# Patient Record
Sex: FEMALE
[De-identification: names, ages, dates, MRNs, and addresses within clinical notes are randomized per-mention and may not be internally consistent; named-entity substitution may affect disease eponyms.]

---

## 2020-02-02 ENCOUNTER — HOSPITAL ENCOUNTER (OUTPATIENT)
Dept: HOSPITAL 5 - TRG | Age: 40
Discharge: HOME | End: 2020-02-02
Attending: OBSTETRICS & GYNECOLOGY
Payer: COMMERCIAL

## 2020-02-02 VITALS — DIASTOLIC BLOOD PRESSURE: 76 MMHG | SYSTOLIC BLOOD PRESSURE: 110 MMHG

## 2020-02-02 DIAGNOSIS — H53.8: ICD-10-CM

## 2020-02-02 DIAGNOSIS — Z3A.37: ICD-10-CM

## 2020-02-02 DIAGNOSIS — O26.893: Primary | ICD-10-CM

## 2020-02-02 LAB
ALBUMIN SERPL-MCNC: 3.4 G/DL (ref 3.9–5)
ALT SERPL-CCNC: 10 UNITS/L (ref 7–56)
BACTERIA #/AREA URNS HPF: (no result) /HPF
BASOPHILS # (AUTO): 0 K/MM3 (ref 0–0.1)
BASOPHILS NFR BLD AUTO: 0.2 % (ref 0–1.8)
BILIRUB UR QL STRIP: (no result)
BLOOD UR QL VISUAL: (no result)
BUN SERPL-MCNC: 6 MG/DL (ref 7–17)
BUN/CREAT SERPL: 20 %
CALCIUM SERPL-MCNC: 10.1 MG/DL (ref 8.4–10.2)
EOSINOPHIL # BLD AUTO: 0 K/MM3 (ref 0–0.4)
EOSINOPHIL NFR BLD AUTO: 0.3 % (ref 0–4.3)
HCT VFR BLD CALC: 35.8 % (ref 30.3–42.9)
HEMOLYSIS INDEX: 6
HGB BLD-MCNC: 12.2 GM/DL (ref 10.1–14.3)
LYMPHOCYTES # BLD AUTO: 1.2 K/MM3 (ref 1.2–5.4)
LYMPHOCYTES NFR BLD AUTO: 14.6 % (ref 13.4–35)
MCHC RBC AUTO-ENTMCNC: 34 % (ref 30–34)
MCV RBC AUTO: 87 FL (ref 79–97)
MONOCYTES # (AUTO): 0.6 K/MM3 (ref 0–0.8)
MONOCYTES % (AUTO): 7.2 % (ref 0–7.3)
PH UR STRIP: 8 [PH] (ref 5–7)
PLATELET # BLD: 203 K/MM3 (ref 140–440)
PROT UR STRIP-MCNC: (no result) MG/DL
RBC # BLD AUTO: 4.12 M/MM3 (ref 3.65–5.03)
RBC #/AREA URNS HPF: < 1 /HPF (ref 0–6)
UROBILINOGEN UR-MCNC: < 2 MG/DL (ref ?–2)
WBC #/AREA URNS HPF: < 1 /HPF (ref 0–6)

## 2020-02-02 PROCEDURE — 87086 URINE CULTURE/COLONY COUNT: CPT

## 2020-02-02 PROCEDURE — 85025 COMPLETE CBC W/AUTO DIFF WBC: CPT

## 2020-02-02 PROCEDURE — 81001 URINALYSIS AUTO W/SCOPE: CPT

## 2020-02-02 PROCEDURE — 36415 COLL VENOUS BLD VENIPUNCTURE: CPT

## 2020-02-02 PROCEDURE — 80053 COMPREHEN METABOLIC PANEL: CPT

## 2020-02-02 NOTE — PROGRESS NOTE
Assessment and Plan


A: Pregnancy at 37 2/7 weeks gestation. 


Headache, possible migraine.


Mild tachycardia.


Not in labor.


P: Labs done (urine culture pending).


Fioricet given without relief of headache. 


Plan to discharge patient to ED to be further evaluated by ED doctor. Called ED 

and told them I was bringing patient over to ED by wheelchair. Patient refused 

to see ED doctor and signed out AMA. Risks of signing out AMA discussed with 

patient. Encouraged patient to come back if symptoms do not resolve or if any 

further symptoms. 








Subjective





- Subjective


Date of service: 20


Principal diagnosis: Pregnancy at 37 2/7 weeks gestation; headache


Interval history: 


39 year old  presents at 37 2/7 weeks complaining of a throbbing headache

on the right side of her head since about 09:00 this morning. Patient reports 

blurry vision in both eyes. She states pain on the right side of her head is 

10/10. She denies falls or MVAs or trauma. She denies nausea or vomiting or 

abdominal pain. She denies exposure to any sick people or any recent travel. She

denies diarrhea or cough or sore throat. She denies swelling. Patient reports 

active fetal movement. She denies regular contractions, leaking of fluid or 

vaginal bleeding. She states she does not have any known health problems and has

never had migraine headaches. She is not currently taking any medications. She 

denies any problems with this pregnancy. She state she goes to [facility]

and has an appointment there tomorrow for prenatal visit. 





Patient reports: fetal movement normal, no loss of fluid, no vaginal bleeding, 

no contractions





Objective





- Vital Signs


Vital Signs: 


                               Vital Signs - 12hr











  20





  12:43 12:48 12:53


 


Temperature  98.4 F 


 


Pulse Rate 104 H 107 H 105 H


 


Blood Pressure 117/75  


 


O2 Sat by Pulse 97 98 98





Oximetry   














  20





  12:58 12:59 13:03


 


Temperature   


 


Pulse Rate 106 H 107 H 105 H


 


Blood Pressure  106/68 


 


O2 Sat by Pulse 98  98





Oximetry   














  20





  13:08 13:13 13:15


 


Temperature   


 


Pulse Rate 109 H 102 H 110 H


 


Blood Pressure   110/74


 


O2 Sat by Pulse 99 97 





Oximetry   














  20





  13:18 13:23 13:26


 


Temperature   


 


Pulse Rate 105 H 110 H 111 H


 


Blood Pressure   


 


O2 Sat by Pulse 97 100 94





Oximetry   














  20





  13:28 13:29 13:33


 


Temperature   


 


Pulse Rate 114 H 115 H 110 H


 


Blood Pressure  110/76 


 


O2 Sat by Pulse 96  92





Oximetry   














  20





  13:34 13:38 13:39


 


Temperature   


 


Pulse Rate 104 H 102 H 109 H


 


Blood Pressure   


 


O2 Sat by Pulse 93 83 L 94





Oximetry   














- Exam


Narrative Exam: 


A&O, NAD. Talking clearly; answers questions appropriately.


No swelling noted. 


Pulse 100-115 bpm, regular.


Eyes PERRL.


Moves all extremities well. Bilateral  strength is equal and strong. Normal 

and symmetrical motion in arms and legs. Symmetrical facial movements; 

symmetrical smile. 





Abdomen: Present: normal appearance, soft.  Absent: distention, tenderness, 

guarding, rigidity


Uterus: Present: normal, fundal height above umbilicus.  Absent: tenderness


FHR: category 1


Uterine Contraction Monitor Mode: External


Cervical Dilatation: 0


Cervical Effacement Percentage: 0


Fetal station: high


Uterine Contraction Pattern: Absent





- Labs


Labs: 


                                  Abnormal Labs











  20





  13:53 14:14 14:14


 


RDW   12.7 L 


 


Seg Neutrophils %   77.7 H 


 


Sodium    134 L


 


Carbon Dioxide    19 L


 


BUN    6 L


 


Creatinine    0.3 L


 


Alkaline Phosphatase    170 H


 


Albumin    3.4 L


 


Urine pH  8.0 H  


 


Ur Specific Gravity  1.002 L  








                         Laboratory Results - last 24 hr











  20





  13:53 14:14 14:14


 


WBC   8.3 


 


RBC   4.12 


 


Hgb   12.2 


 


Hct   35.8 


 


MCV   87 


 


MCH   30 


 


MCHC   34 


 


RDW   12.7 L 


 


Plt Count   203 


 


Lymph % (Auto)   14.6 


 


Mono % (Auto)   7.2 


 


Eos % (Auto)   0.3 


 


Baso % (Auto)   0.2 


 


Lymph #   1.2 


 


Mono #   0.6 


 


Eos #   0.0 


 


Baso #   0.0 


 


Seg Neutrophils %   77.7 H 


 


Seg Neutrophils #   6.5 


 


Sodium    134 L


 


Potassium    3.9


 


Chloride    99.9


 


Carbon Dioxide    19 L


 


Anion Gap    19


 


BUN    6 L


 


Creatinine    0.3 L


 


Estimated GFR    > 60


 


BUN/Creatinine Ratio    20


 


Glucose    79


 


Calcium    10.1


 


Total Bilirubin    0.70


 


AST    16


 


ALT    10


 


Alkaline Phosphatase    170 H


 


Total Protein    6.5


 


Albumin    3.4 L


 


Albumin/Globulin Ratio    1.1


 


Urine Color  Straw  


 


Urine Turbidity  Clear  


 


Urine pH  8.0 H  


 


Ur Specific Gravity  1.002 L  


 


Urine Protein  <15 mg/dl  


 


Urine Glucose (UA)  Neg  


 


Urine Ketones  Neg  


 


Urine Blood  Sm  


 


Urine Nitrite  Neg  


 


Urine Bilirubin  Neg  


 


Urine Urobilinogen  < 2.0  


 


Ur Leukocyte Esterase  Neg  


 


Urine WBC (Auto)  < 1.0  


 


Urine RBC (Auto)  < 1.0  


 


Urine Bacteria (Auto)  2+

## 2020-02-26 ENCOUNTER — HOSPITAL ENCOUNTER (INPATIENT)
Dept: HOSPITAL 5 - TRG | Age: 40
LOS: 2 days | Discharge: HOME | End: 2020-02-28
Attending: OBSTETRICS & GYNECOLOGY | Admitting: OBSTETRICS & GYNECOLOGY
Payer: COMMERCIAL

## 2020-02-26 DIAGNOSIS — O99.89: ICD-10-CM

## 2020-02-26 DIAGNOSIS — Z88.0: ICD-10-CM

## 2020-02-26 DIAGNOSIS — Z82.49: ICD-10-CM

## 2020-02-26 DIAGNOSIS — Z3A.40: ICD-10-CM

## 2020-02-26 DIAGNOSIS — Z83.3: ICD-10-CM

## 2020-02-26 DIAGNOSIS — M06.9: ICD-10-CM

## 2020-02-26 LAB
HCT VFR BLD CALC: 39 % (ref 30.3–42.9)
HGB BLD-MCNC: 12.7 GM/DL (ref 10.1–14.3)
MCHC RBC AUTO-ENTMCNC: 33 % (ref 30–34)
MCV RBC AUTO: 88 FL (ref 79–97)
PLATELET # BLD: 164 K/MM3 (ref 140–440)
RBC # BLD AUTO: 4.45 M/MM3 (ref 3.65–5.03)

## 2020-02-26 PROCEDURE — 36415 COLL VENOUS BLD VENIPUNCTURE: CPT

## 2020-02-26 PROCEDURE — 85018 HEMOGLOBIN: CPT

## 2020-02-26 PROCEDURE — 85014 HEMATOCRIT: CPT

## 2020-02-26 PROCEDURE — 86901 BLOOD TYPING SEROLOGIC RH(D): CPT

## 2020-02-26 PROCEDURE — 86850 RBC ANTIBODY SCREEN: CPT

## 2020-02-26 PROCEDURE — 86900 BLOOD TYPING SEROLOGIC ABO: CPT

## 2020-02-26 PROCEDURE — 85027 COMPLETE CBC AUTOMATED: CPT

## 2020-02-26 RX ADMIN — HYDROCODONE BITARTRATE AND ACETAMINOPHEN PRN EACH: 5; 325 TABLET ORAL at 23:40

## 2020-02-26 RX ADMIN — BUTORPHANOL TARTRATE PRN MG: 2 INJECTION, SOLUTION INTRAMUSCULAR; INTRAVENOUS at 20:35

## 2020-02-26 RX ADMIN — SODIUM CHLORIDE, SODIUM LACTATE, POTASSIUM CHLORIDE, AND CALCIUM CHLORIDE SCH MLS/HR: .6; .31; .03; .02 INJECTION, SOLUTION INTRAVENOUS at 20:57

## 2020-02-26 RX ADMIN — BUTORPHANOL TARTRATE PRN MG: 2 INJECTION, SOLUTION INTRAMUSCULAR; INTRAVENOUS at 17:16

## 2020-02-26 RX ADMIN — SODIUM CHLORIDE, SODIUM LACTATE, POTASSIUM CHLORIDE, AND CALCIUM CHLORIDE SCH MLS/HR: .6; .31; .03; .02 INJECTION, SOLUTION INTRAVENOUS at 14:22

## 2020-02-26 RX ADMIN — ONDANSETRON PRN MG: 2 INJECTION INTRAMUSCULAR; INTRAVENOUS at 20:35

## 2020-02-26 RX ADMIN — ONDANSETRON PRN MG: 2 INJECTION INTRAMUSCULAR; INTRAVENOUS at 17:16

## 2020-02-26 NOTE — HISTORY AND PHYSICAL REPORT
History of Present Illness


Date of examination: 20


Date of admission: 


20 08:13





Chief complaint: 


Presents for a scheduled induction schedule


History of present illness: 


Early entry to prenatal care, Prenatal course complicated by Rheumatoid 

Arthritis; Co-managed with [facility]. 








Past History


Past Medical History: other (Rheumatoid Arthritis )


Past Surgical History: other (hernia removed)


Family/Genetic History: diabetes, hypertension


Social history: no significant social history, 





- Obstetrical History


Expected Date of Delivery: 20


Actual Gestation: 40 Week(s) 2 Day(s) 


: 4


Para: 2


Spontaneous Abortions: 1


Number of Living Children: 2


  ** #1


Infant Gender: Female


Birth year: 


Birthweight: 3.345 kg


Method of Delivery: Vaginal


Gestational age at delivery: 39


Complications: none





  ** #2


Infant Gender: Male


Birth year: 


Birthweight: 2.948 kg


Method of Delivery: Vaginal


Gestational age at delivery: 41


Complications: none





Medications and Allergies


                                    Allergies











Allergy/AdvReac Type Severity Reaction Status Date / Time


 


Penicillins Allergy Mild Rash Verified 20 13:47











                                Home Medications











 Medication  Instructions  Recorded  Confirmed  Last Taken  Type


 


Prenatal Vitamin 1 tab PO DAILY 20 History











Active Meds: 


Active Medications





Butorphanol Tartrate (Stadol)  2 mg IV Q2H PRN


   PRN Reason: Labor Pain


Ephedrine Sulfate (Ephedrine Sulfate)  10 mg IV Q2M PRN


   PRN Reason: Hypotension


Oxytocin/Sodium Chloride (Pitocin/Ns 20 Unit/1000ml Drip)  20 units in 1,000 mls

@ 125 mls/hr IV AS DIRECT CAL


Lactated Ringer's (Lactated Ringers)  1,000 mls @ 125 mls/hr IV AS DIRECT CAL


   Last Admin: 20 14:22 Dose:  125 mls/hr


   Documented by: 


Oxytocin/Sodium Chloride (Pitocin/Ns 30 Unit/500ml)  30 units in 500 mls @ 4 m

ls/hr IV TITR CAL; Protocol


   Last Admin: 20 14:23 Dose:  4 mls/hr, 4 mls/hr


   Documented by: 


Lidocaine (Xylocaine 2%)  20 ml INFILTRATI ONCE NR


   Stop: 20 09:59


Mineral Oil (Mineral Oil)  30 ml PO QHS PRN


   PRN Reason: Constipation


Naloxone HCl (Naloxone)  0.1 mg IV Q2MIN PRN


   PRN Reason: Res Rate </= 8 or 02 SAT < 92%


Ondansetron HCl (Zofran)  4 mg IV Q8H PRN


   PRN Reason: Nausea And Vomiting


Terbutaline Sulfate (Brethine)  0.25 mg SUB-Q ONCE PRN


   PRN Reason: Hyperstimulation/Hypertonicity


Terbutaline Sulfate (Brethine)  0.25 mg IVP ONCE PRN


   PRN Reason: Hyperstimulation/Hypertonicity











Review of Systems


All systems: negative





- Vital Signs


Vital signs: 


                                   Vital Signs











Temp Pulse Resp BP


 


 97.8 F   112 H  18   122/67 


 


 20 08:36  20 08:36  20 08:36  20 08:36








                                        











Temp Pulse Resp BP Pulse Ox


 


 98.0 F   109 H  18   125/70    


 


 20 12:03  20 14:27  20 12:03  20 14:27   














- Physical Exam


Breasts: Positive: normal


Cardiovascular: Regular rate


Lungs: Positive: Clear to auscultation, Normal air movement


Abdomen: Positive: normal appearance, soft, normal bowel sounds


Genitourinary (Female): Positive: normal external genitalia, normal perenium


Vagina: Positive: normal moisture


Uterus: Positive: enlarged


Anus/Rectum: Positive: normal perianal skin


Extremities: Positive: edema (+1)





- Obstetrical


FHR: category 1


Uterine Contraction Monitor Mode: External


Cervical Dilatation: 3 (VTX, Intact)


Cervical Effacement Percentage: 70


Fetal station: -2


Uterine Contraction Pattern: Irregular


Uterine Tone Measurement Phase: Resting


Uterine Contraction Intensity: Mild





Results


Result Diagrams: 


                                 20 10:20





                              Abnormal lab results











  20 Range/Units





  10:20 


 


RDW  13.0 L  (13.2-15.2)  %








All other labs normal.








Assessment and Plan


A: IUP @ 40 2/7 Weeks


     Category I Tracing


    AMA


    Rheumatoid Arthritis


    GBS Negative





P: Admit to L&D per Routine Orders


    Cytotec Induction


    Pitocin Augmentation

## 2020-02-26 NOTE — PROGRESS NOTE
Assessment and Plan


A: IUP @ 40 2/7 Weeks


     Fetal Bradycardia (resolved)


     Category I Tracing


     AMA


     Rheumatoid Arthritis


     GBS Negative





P: D/C Pitocin 


    Dr. Harrington called to Bedside


    Multiple Maternal Positions Changes


    O2 Mask 


    Fluid Bolous 


    AROM


    Internals x 2


    Re-start pitocin in 45mins to 1 hour; if FHTs remain stable





Subjective





- Subjective


Date of service: 02/26/20


Interval history: 


Early entry to prenatal care, Prenatal course complicated by Rheumatoid 

Arthritis; Co-managed with [facility]. 








Objective





- Vital Signs


Vital Signs: 


                               Vital Signs - 12hr











  02/26/20 02/26/20 02/26/20





  08:36 12:03 12:05


 


Temperature 97.8 F 98.0 F 


 


Pulse Rate 112 H 90 90


 


Respiratory 18 18 





Rate   


 


Blood Pressure 122/67  111/65


 


Blood Pressure 122/67  





[Left]   


 


Blood Pressure  111/65 





[Right]   


 


O2 Sat by Pulse   





Oximetry   














  02/26/20 02/26/20





  14:27 14:40


 


Temperature  


 


Pulse Rate 109 H 117 H


 


Respiratory  





Rate  


 


Blood Pressure 125/70 


 


Blood Pressure  





[Left]  


 


Blood Pressure  





[Right]  


 


O2 Sat by Pulse  100





Oximetry  














- Exam


Breasts: normal


Cardiovascular: Regular rate


Abdomen: Present: normal appearance, soft, normal bowel sounds


Uterus: Present: normal, firm


FHR: category 1


FHR comments: 


Infant had a bradycardia down to the 60s soon after Pitocin was started.  O2 

face mask placed; fluid bolous started; AROM and internals placed x2, patient pu

t in the knee chest position, and Dr. Harrington called to bedside.  FHTs 

returned to Category I Tracing





Uterine Contraction Monitor Mode: Internal


Cervical Dilatation: 3 (Moderate amount of clear fluid upon AROM at 1440)


Cervical Effacement Percentage: 70


Fetal station: -2


Uterine Contraction Pattern: Irregular


Uterine Tone Measurement Phase: Resting


Uterine Contraction Intensity: Moderate


Extremities: edema





- Labs


Labs: 


                                  Abnormal Labs











  02/26/20





  10:20


 


RDW  13.0 L








                         Laboratory Results - last 24 hr











  02/26/20 02/26/20





  10:15 10:20


 


WBC   8.1


 


RBC   4.45


 


Hgb   12.7


 


Hct   39.0


 


MCV   88


 


MCH   29


 


MCHC   33


 


RDW   13.0 L


 


Plt Count   164


 


Blood Type  B POSITIVE 


 


Antibody Screen  Negative

## 2020-02-26 NOTE — PROGRESS NOTE
Assessment and Plan


A: IUP @ 40 2/7 Weeks


     Category I Tracing


     AMA


     Rheumatoid Arthritis


     GBS Negative





P: Continue Pitocin Augmentation


    Multiple Maternal Positions Changes


   





Subjective





- Subjective


Date of service: 02/26/20


Interval history: 


Early entry to prenatal care, Prenatal course complicated by Rheumatoid 

Arthritis; Co-managed with [facility]. 





Patient reports: fetal movement normal, contractions





Objective





- Vital Signs


Vital Signs: 


                               Vital Signs - 12hr











  02/26/20 02/26/20 02/26/20





  08:36 12:03 12:05


 


Temperature 97.8 F 98.0 F 


 


Pulse Rate 112 H 90 90


 


Respiratory 18 18 





Rate   


 


Blood Pressure 122/67  111/65


 


Blood Pressure 122/67  





[Left]   


 


Blood Pressure  111/65 





[Right]   


 


O2 Sat by Pulse   





Oximetry   














  02/26/20 02/26/20 02/26/20





  14:27 14:40 15:26


 


Temperature   


 


Pulse Rate 109 H 117 H 82


 


Respiratory   





Rate   


 


Blood Pressure 125/70  110/56


 


Blood Pressure   





[Left]   


 


Blood Pressure   





[Right]   


 


O2 Sat by Pulse  100 





Oximetry   














  02/26/20 02/26/20 02/26/20





  15:41 15:46 15:51


 


Temperature   


 


Pulse Rate 96 H 94 H 94 H


 


Respiratory   





Rate   


 


Blood Pressure   


 


Blood Pressure   





[Left]   


 


Blood Pressure   





[Right]   


 


O2 Sat by Pulse 99 98 99





Oximetry   














  02/26/20 02/26/20 02/26/20





  15:56 16:01 16:06


 


Temperature   


 


Pulse Rate 99 H 95 H 98 H


 


Respiratory   





Rate   


 


Blood Pressure   


 


Blood Pressure   





[Left]   


 


Blood Pressure   





[Right]   


 


O2 Sat by Pulse 99 100 99





Oximetry   














  02/26/20 02/26/20 02/26/20





  16:11 16:16 16:21


 


Temperature   


 


Pulse Rate 107 H 100 H 98 H


 


Respiratory   





Rate   


 


Blood Pressure   


 


Blood Pressure   





[Left]   


 


Blood Pressure   





[Right]   


 


O2 Sat by Pulse 99 99 99





Oximetry   














  02/26/20 02/26/20 02/26/20





  16:29 16:34 16:39


 


Temperature   


 


Pulse Rate 104 H 91 H 84


 


Respiratory   





Rate   


 


Blood Pressure   


 


Blood Pressure   





[Left]   


 


Blood Pressure   





[Right]   


 


O2 Sat by Pulse 99 100 99





Oximetry   














  02/26/20 02/26/20 02/26/20





  16:44 16:49 16:54


 


Temperature   


 


Pulse Rate 92 H 103 H 105 H


 


Respiratory   





Rate   


 


Blood Pressure   


 


Blood Pressure   





[Left]   


 


Blood Pressure   





[Right]   


 


O2 Sat by Pulse 98 98 99





Oximetry   














  02/26/20 02/26/20 02/26/20





  16:59 17:04 17:09


 


Temperature   


 


Pulse Rate 99 H 99 H 99 H


 


Respiratory   





Rate   


 


Blood Pressure   


 


Blood Pressure   





[Left]   


 


Blood Pressure   





[Right]   


 


O2 Sat by Pulse 99 99 97





Oximetry   














  02/26/20 02/26/20 02/26/20





  17:14 17:19 17:24


 


Temperature   


 


Pulse Rate 95 H 107 H 103 H


 


Respiratory   





Rate   


 


Blood Pressure   


 


Blood Pressure   





[Left]   


 


Blood Pressure   





[Right]   


 


O2 Sat by Pulse 97 98 96





Oximetry   














  02/26/20 02/26/20 02/26/20





  17:29 17:33 17:34


 


Temperature   


 


Pulse Rate 105 H 114 H 100 H


 


Respiratory   





Rate   


 


Blood Pressure   


 


Blood Pressure   





[Left]   


 


Blood Pressure   





[Right]   


 


O2 Sat by Pulse 96 94 96





Oximetry   














  02/26/20 02/26/20 02/26/20





  17:39 17:44 17:49


 


Temperature   


 


Pulse Rate 107 H 101 H 114 H


 


Respiratory   





Rate   


 


Blood Pressure   


 


Blood Pressure   





[Left]   


 


Blood Pressure   





[Right]   


 


O2 Sat by Pulse 97 96 95





Oximetry   














  02/26/20 02/26/20 02/26/20





  17:54 17:59 18:04


 


Temperature   


 


Pulse Rate 110 H 110 H 106 H


 


Respiratory   





Rate   


 


Blood Pressure   


 


Blood Pressure   





[Left]   


 


Blood Pressure   





[Right]   


 


O2 Sat by Pulse 96 98 95





Oximetry   














  02/26/20





  18:06


 


Temperature 


 


Pulse Rate 122 H


 


Respiratory 





Rate 


 


Blood Pressure 


 


Blood Pressure 





[Left] 


 


Blood Pressure 





[Right] 


 


O2 Sat by Pulse 94





Oximetry 














- Exam


Breasts: normal


Cardiovascular: Regular rate


Lungs: Clear to auscultation, Normal air movement


Abdomen: Present: normal appearance, soft, normal bowel sounds


Uterus: Present: normal, firm, fundal height above umbilicus


FHR: category 1


Uterine Contraction Monitor Mode: External


Cervical Dilatation: 3 (leaking a small amount of clear fluid)


Cervical Effacement Percentage: 80


Fetal station: -2


Uterine Contraction Frequency (min): 3


Uterine Contraction Pattern: Regular


Uterine Tone Measurement Phase: Resting


Uterine Contraction Intensity: Moderate


Extremities: edema





- Labs


Labs: 


                                  Abnormal Labs











  02/26/20





  10:20


 


RDW  13.0 L








                         Laboratory Results - last 24 hr











  02/26/20 02/26/20





  10:15 10:20


 


WBC   8.1


 


RBC   4.45


 


Hgb   12.7


 


Hct   39.0


 


MCV   88


 


MCH   29


 


MCHC   33


 


RDW   13.0 L


 


Plt Count   164


 


Blood Type  B POSITIVE 


 


Antibody Screen  Negative

## 2020-02-26 NOTE — PROCEDURE NOTE
OB Delivery Note





- Delivery


Date of Delivery: 20 (2312)


Surgeon: MARIXA MOREIRA


Estimated blood loss: 200cc





- Vaginal


Delivery presentation: vertex


Delivery position: OA


Intrapartum events: extend. fetal bradycardia


Delivery induction: misoprostol


Delivery augmentation: rupture of membranes, pitocin


Delivery monitor: internal FHT, internal uterine


Route of delivery: 


Delivery placenta: spontaneous


Delivery cord: 3 umbilical vessels


Delivery laceration: none


Anesthesia: none


Delivery comments: 


 of a live 8'11 male infant over a intact perineum under IV pain control 

with Apgars of 8 and 9 at 2312 on 2020.  Infant directly to maternal 

abd/chest, skin to skin contact. Spontaneous delivery of placenta complete and 

intact with Gandhi side presenting at 2319.  Fundus is firm and midline located

3 below the U. Lochia is scant. Delayed cord clamping and cutting; cord cut by 

the Father of the Baby.  








- Infant


  ** A


Apgar at 1 minute: 8


Apgar at 5 minutes: 9


Infant Gender: Male (8'11)

## 2020-02-27 LAB
HCT VFR BLD CALC: 37 % (ref 30.3–42.9)
HGB BLD-MCNC: 12.1 GM/DL (ref 10.1–14.3)

## 2020-02-27 RX ADMIN — Medication SCH EACH: at 14:32

## 2020-02-27 RX ADMIN — IBUPROFEN SCH MG: 600 TABLET, FILM COATED ORAL at 23:53

## 2020-02-27 RX ADMIN — IBUPROFEN SCH MG: 600 TABLET, FILM COATED ORAL at 05:35

## 2020-02-27 RX ADMIN — IBUPROFEN SCH MG: 600 TABLET, FILM COATED ORAL at 14:32

## 2020-02-27 NOTE — PROGRESS NOTE
Assessment and Plan


A: PP Day #1


     Stable





P: Follow Routine Postpartum Orders


   Depo Provera 150mg IM prior to discharge


   RTO in 3 Weeks for a Sterilization Consult


   D/C Home in the AM








Subjective





- Subjective


Date of service: 20


Interval history: 


Early entry to prenatal care, Prenatal course complicated by Rheumatoid 

Arthritis; Co-managed with [facility]. 





Patient reports: appetite normal, voiding normally, pain well controlled, 

flatus, ambulating normally


[place]: doing well, bottle feeding (and breastfeeding)





Objective





- Vital Signs


Latest vital signs: 


                                   Vital Signs











  Temp Pulse Resp BP BP Pulse Ox


 


 20 08:34  97.9 F  89  20  109/58   97


 


 20 04:44  98.2 F  103 H  20  108/65   93


 


 20 00:35  99.6 F  100 H  18   118/66  97


 


 20 00:12   109 H   124/62  


 


 20 23:57   116 H   123/73  


 


 20 23:26   113 H   134/62  


 


 20 22:26   110 H   125/69  


 


 20 21:28   85   130/65  


 


 20 20:27   98 H   117/57  


 


 20 19:27   103 H   145/80  


 


 20 19:00  97.4 F L     


 


 20 18:27  98.0 F  91 H   116/56  


 


 20 18:06   122 H     94


 


 20 18:04   106 H     95


 


 20 17:59   110 H     98


 


 20 17:54   110 H     96


 


 20 17:49   114 H     95


 


 20 17:44   101 H     96


 


 20 17:39   107 H     97


 


 20 17:34   100 H     96


 


 20 17:33   114 H     94


 


 20 17:29   105 H     96


 


 20 17:24   103 H     96


 


 20 17:19   107 H     98


 


 20 17:14   95 H     97


 


 20 17:09   99 H     97


 


 20 17:04   99 H     99


 


 20 16:59   99 H     99


 


 20 16:54   105 H     99


 


 20 16:49   103 H     98


 


 20 16:44   92 H     98


 


 02/26/20 16:39   84     99


 


 20 16:34   91 H     100


 


 20 16:29   104 H     99


 


 20 16:21   98 H     99


 


 20 16:16   100 H     99


 


 20 16:11   107 H     99


 


 20 16:06   98 H     99


 


 20 16:01   95 H     100


 


 20 15:56   99 H     99


 


 20 15:51   94 H     99


 


 20 15:46   94 H     98


 


 20 15:41   96 H     99


 


 20 15:26   82   110/56  


 


 20 14:40   117 H     100


 


 20 14:27   109 H   125/70  


 


 20 12:05   90   111/65  


 


 20 12:03  98.0 F  90  18   111/65 








                                Intake and Output











 20





 22:59 06:59 14:59


 


Intake Total 816.417 120 


 


Output Total  600 


 


Balance 816.417 -480 


 


Intake:   


 


  .417  


 


    Lactated Ringers 1,000 ml 766.667  





    @ 125 mls/hr IV AS   





    DIRECT CAL Rx#:[id number]   


 


    Left Wrist 10  


 


    PITOCin/NS 30 UNIT/500ML 39.750  





    30 units In 500 ml @ 4   





    mls/hr IV TITR CAL Rx#:   





    [id number]   


 


  Oral  120 


 


Output:   


 


  Urine  600 


 


    Void  600 


 


Other:   


 


  Total, Intake Amount  120 


 


  Total, Output Amount  600 


 


  # Voids   


 


    Void  1 














- Exam


Breasts: Present: normal


Cardiovascular: Present: Regular rate


Lungs: Present: Clear to auscultation, Normal air movement


Abdomen: Present: normal appearance, soft, normal bowel sounds


Uterus: Present: normal, firm, fundal height below umbilicus


Extremities: Present: normal





- Labs


Labs: 


                              Abnormal lab results











  20 Range/Units





  10:20 


 


RDW  13.0 L  (13.2-15.2)  %

## 2020-02-28 VITALS — SYSTOLIC BLOOD PRESSURE: 123 MMHG | DIASTOLIC BLOOD PRESSURE: 81 MMHG

## 2020-02-28 RX ADMIN — IBUPROFEN SCH MG: 600 TABLET, FILM COATED ORAL at 06:10

## 2020-02-28 RX ADMIN — IBUPROFEN SCH MG: 600 TABLET, FILM COATED ORAL at 13:35

## 2020-02-28 RX ADMIN — Medication SCH EACH: at 13:34

## 2020-02-28 RX ADMIN — HYDROCODONE BITARTRATE AND ACETAMINOPHEN PRN EACH: 5; 325 TABLET ORAL at 13:34

## 2023-01-26 NOTE — DISCHARGE SUMMARY
Providers





- Providers


Date of Admission: 


20 08:13





Date of discharge: 20


Attending physician: 


UCHE LOPEZ MD





Primary care physician: 


UCHE LOPEZ MD








Hospitalization


Reason for admission: induction of labor


Delivery: 


Episiotomy: none


Laceration: none


Other postpartum procedures: none


Postpartum complications: none


Discharge diagnosis: IUP at term delivered


[place] baby: male


Condition at discharge: Poor


Disposition: DC-01 TO HOME OR SELFCARE





Plan





- Provider Discharge Summary


Activity: routine, no sex for 6 weeks, no heavy lifting 4 weeks, no strenuous 

exercise


Diet: routine


Instructions: routine


Additional instructions: 


[]  Smoking cessation referral if applicable(refer to patient education folder 

for contact #)


[]  Refer to [facility]'s [facility] Booklet








Call your doctor immediately for:


* Fever > 100.5


* Heavy vaginal bleeding ( >1 pad per hour)


* Severe persistent headache


* Shortness of breath


* Reddened, hot, painful area to leg or breast


* Drainage or odor from incision.





* Keep incision clean and dry at all times and follow doctor's instructions 

regarding bathing/showering











- Follow up plan


Follow up: 


UCHE LOPEZ MD [Primary Care Provider] - 20
Self